# Patient Record
Sex: FEMALE | Employment: UNEMPLOYED | URBAN - METROPOLITAN AREA
[De-identification: names, ages, dates, MRNs, and addresses within clinical notes are randomized per-mention and may not be internally consistent; named-entity substitution may affect disease eponyms.]

---

## 2017-01-31 ENCOUNTER — APPOINTMENT (OUTPATIENT)
Dept: LACTATION | Facility: HOSPITAL | Age: 37
End: 2017-01-31
Attending: OBSTETRICS & GYNECOLOGY

## 2017-02-28 ENCOUNTER — APPOINTMENT (OUTPATIENT)
Dept: LACTATION | Facility: HOSPITAL | Age: 37
End: 2017-02-28
Attending: OBSTETRICS & GYNECOLOGY

## 2017-03-22 ENCOUNTER — APPOINTMENT (OUTPATIENT)
Dept: LACTATION | Facility: HOSPITAL | Age: 37
End: 2017-03-22
Attending: OBSTETRICS & GYNECOLOGY

## 2018-04-19 PROCEDURE — 84146 ASSAY OF PROLACTIN: CPT | Performed by: FAMILY MEDICINE

## 2018-08-23 PROCEDURE — 87624 HPV HI-RISK TYP POOLED RSLT: CPT | Performed by: OBSTETRICS & GYNECOLOGY

## 2018-08-23 PROCEDURE — 88175 CYTOPATH C/V AUTO FLUID REDO: CPT | Performed by: OBSTETRICS & GYNECOLOGY

## 2020-08-07 PROBLEM — Z91.89 AT HIGH RISK FOR BREAST CANCER: Status: ACTIVE | Noted: 2020-08-07

## 2020-08-07 PROBLEM — Z80.3 FAMILY HISTORY OF BREAST CANCER: Status: ACTIVE | Noted: 2020-08-07

## 2020-08-07 PROBLEM — Z80.0 FAMILY HISTORY OF COLON CANCER: Status: ACTIVE | Noted: 2020-08-07

## 2020-08-07 PROBLEM — R92.30 DENSE BREAST TISSUE: Status: ACTIVE | Noted: 2020-08-07

## 2020-08-07 PROBLEM — R92.2 DENSE BREAST TISSUE: Status: ACTIVE | Noted: 2020-08-07

## 2024-04-04 ENCOUNTER — GENETICS ENCOUNTER (OUTPATIENT)
Dept: GENETICS | Facility: HOSPITAL | Age: 44
End: 2024-04-04
Attending: GENETIC COUNSELOR, MS
Payer: COMMERCIAL

## 2024-04-04 ENCOUNTER — NURSE ONLY (OUTPATIENT)
Dept: HEMATOLOGY/ONCOLOGY | Facility: HOSPITAL | Age: 44
End: 2024-04-04
Attending: GENETIC COUNSELOR, MS
Payer: COMMERCIAL

## 2024-04-04 DIAGNOSIS — Z80.0 FAMILY HISTORY OF PANCREATIC CANCER: ICD-10-CM

## 2024-04-04 DIAGNOSIS — Z80.3 FAMILY HISTORY OF BREAST CANCER: Primary | ICD-10-CM

## 2024-04-04 PROCEDURE — 36415 COLL VENOUS BLD VENIPUNCTURE: CPT

## 2024-04-04 PROCEDURE — 96040 HC GENETIC COUNSELING EA 30 MIN: CPT | Performed by: GENETIC COUNSELOR, MS

## 2024-04-05 NOTE — PROGRESS NOTES
Referring Provider:  Andre Andrews MD (fax: [phone])    Additional Provider(s):  Suzanna Soriano (fax: [phone])    Reason for Referral:  Anamaria Escobar was referred for genetic counseling because of a family history of cancer. Ms. Escobar is a 43 year-old woman of [place], Spanish, Pashto, and [place] descent who has no personal history of cancer. Her ovaries are intact. Menarche was at age 14. Spontaneous menopause was at age 39. Ms. Escobar is not currently taking HRT. Ms. Escobar's last mammogram was on 07/10/23. Ms. Joness last bilateral screening breast MRI was on 22. Ms. Escobar has not had a colonoscopy.     Social History:  Ms. Escobar was seen today by herself. Ms. Escobar lives in [place].     Family History:   A three generation pedigree was obtained.      Ms. Escobar has a seven year-old son and a nine year-old daughter.     Ms. Escobar has three younger sisters and no brothers.      Ms. Escobar's mother had breast cancer in her late 50s and a second breast cancer in the ipsilateral breast in her mid-60s. Ms. Escobar's mother has not had genetic testing. Ms. Escobar's mother had one brother and two sisters. Both of Ms. Escobar's maternal aunts had breast cancer after age 50. Ms. Escobar's maternal uncle  in his late 50s from pancreatic cancer. Ms. Escobar's maternal grandparents  in their 60s or 70s and did not have cancer.     Ms. Escobar's father is 69 years-old and had prostate cancer in his early 60s. Ms. Escobar's father had three sisters and no brothers. One of Ms. Escobar's paternal aunts had breast cancer in her 60s. Ms. Escobar's paternal grandparents  in their 60s and did not have cancer.     Please see the pedigree for additional family history information.     Counseling:   The following information was discussed with Ms. Escobar.    Genetics of Cancer:  The majority of cancers are sporadic whereas approximately 10-30% of cases of cancer cases are attributed to familial factors such as  unidentified low penetrance genes in the family or shared environmental factors.  Approximately 5-10% of cancers are related to a hereditary cancer syndrome.  Signs of a hereditary cancer syndrome include some rare cancers, common cancers occurring at unusually young ages, multiple primary cancers in the some individual, or the same type of cancer or related cancers (e.g., breast and ovarian, colorectal and endometrial) in three or more individuals in the same lineage.      In the [place], approximately 1 in 8 women will develop breast cancer. Although the majority of breast cancer cases are sporadic, approximately 5-10% of women with breast cancer have a hereditary cancer syndrome.  Mutations in the genes, BRCA1 and BRCA2, account for the majority of hereditary breast and ovarian cancer families.  Mutations in genes other than BRCA1/2, many of which now have medical management recommendations (e.g., GABRIELLA, CHEK2, PALB2) are identified in 3-10% of individuals tested using a multigene panel.      Risk Assessment:   Ms. Escobar meets NCCN Guidelines testing criteria for high-penetrance breast cancer susceptibility genes. Risk assessment to estimate the chance of Ms. Escobar harboring a BRCA1/2 pathogenic variant was done by using the Adams II Model. Based on this model, Ms. Escobar's risk of carrying a BRCA1/2 pathogenic variant is estimated to be 6%. It is important to note that all prediction models have limitations and medical management should be based on clinical judgment, and personal and family history. In addition, there are no prediction models or testing criteria for moderate penetrance cancer predisposition genes such as GABRIELLA and CHEK2. I recommend that testing be performed as part of a multigene panel.     Genetic Testing (Panel):  The pros, cons, and limitations of genetic testing were discussed including the potential implications of test results on clinical management.     If a pathogenic variant is not  identified (negative result), it is still possible that Ms. Escobar has a pathogenic variant in one of these genes that was not detected by the genetic test, or that the family is dealing with a hereditary cancer syndrome involving a different gene. It is also possible that Ms. Escobar's relatives have a pathogenic variant in one of these genes that Ms. Escobar did not inherit. In this scenario, options for cancer screening/management should be determined according to personal and family histories and should be discussed with a physician.      A variant of uncertain significance is a DNA change that may or may not alter the function of the gene; therefore, it is usually not possible to determine if the gene variant is responsible for an individual's increased cancer risk.     If Ms. Escobar is found to carry a pathogenic variant in a cancer predisposition gene, she is at significantly increased risk for various cancers. The magnitude of these risks, and the cancers for which she is at increased risk would depend on the gene involved. Medical recommendations for individuals with BRCA1/2 pathogenic variants were reviewed as an example. It was also explained that for some of the genes for which testing is available, the associated cancer risks have yet to be determined and medical management recommendations may not yet be available for individuals with pathogenic variants in these genes. If she were to test positive for a pathogenic variant, her children and siblings would each have a 50% chance of carrying the same variant. At-risk adults (>18) would have the option of pursuing targeted genetic testing to clarify their cancer risks. Genetic test results have implications for the entire biological family. Thus, it is recommended that she share her genetic test results with her biological family members so that they may have their risk assessed.     Genetic Information Non-Discrimination Act:  The legal protections of the  Genetic Information Nondiscrimination Act (ALVIN) for health insurance and employment were discussed.  ALVIN does not provide protection for life insurance, disability or long-term care insurance.    Summary and Plan:  Ms. Escobar was referred for genetic counseling because of a family history of cancer. Her reported maternal family history is suspicious for a hereditary cancer syndrome. Genetic testing on Ms. Escobar for BRCA1/2 pathogenic variants as part of a multigene panel is indicated.      At the conclusion of the counseling session Ms. Escobar decided to proceed with genetic testing. Written consent was obtained. Blood and paperwork were sent to [facility] for their Common Hereditary Cancers panel. I anticipate that Ms. Escobar's results will be available within 2-3 weeks and will call her with the results.  Results will also be communicated to Dr. Andrews and Dr. Soriano.    Approximately 50 minutes was spent in consultation with Ms. Escobar.

## 2024-04-15 ENCOUNTER — GENETICS ENCOUNTER (OUTPATIENT)
Dept: HEMATOLOGY/ONCOLOGY | Facility: HOSPITAL | Age: 44
End: 2024-04-15

## 2024-04-15 NOTE — PROGRESS NOTES
Referring Provider:                    Andre Andrews MD (fax: [phone])     Additional Provider(s):              Suzanna Soriano (fax: [phone])     Reason for Referral:  Anamaria Escobar had genetic testing performed on 04/04/24 because of a family history of breast cancer.     Genetic Testing Result:  NEGATIVE - No known pathogenic variants were found in 48 genes including: APC*, GABRIELLA*, AXIN2, BAP1, BARD1, BMPR1A, BRCA1, BRCA2, BRIP1, CDH1, CDK4, CDKN2A (p14ARF), CDKN2A ([id number]), CHEK2, CTNNA1, DICER1*, EPCAM*, FH*, GREM1*, HOXB13, KIT, MBD4, MEN1*, MLH1*, MSH2*, MSH3*, MSH6*, MUTYH, NF1*, NTHL1, PALB2, PDGFRA, PMS2*, POLD1*, POLE, PTEN*, RAD51C, RAD51D, SDHA*, SDHB, SDHC*, SDHD, SMAD4, SMARCA4, STK11, TP53, TSC1*, TSC2, VHL. Please refer to the report from [facility] ([id number]) for additional testing information. These results were discussed with Ms. Escobar by phone on 04/12/24.      Summary and Plan:  These results indicate that it is unlikely that Ms. Escobar has a pathogenic variant in any of the genes listed above. The limitations of the testing include the chance that a pathogenic variant in a gene other than those included in this analysis might be the cause of cancer in Ms. Escobar's relatives. I encourage Ms. Escobar to contact me on an annual basis to see if there have been any updates in genetic testing that would apply to her or to inform me if there are any changes to the family history.    In the meantime, Ms. Escobar and her relatives should speak with their physicians to discuss recommended medical management according to their personal and family history. Ms. Joness lifetime risk for breast cancer is estimated to be 22.6% based on Tyrer-Cuzick model, version 8. I encouraged Ms. Escobar to discuss the pros and cons of increased breast cancer surveillance with her medical providers.       Cc:  Anamaria Escobar